# Patient Record
Sex: MALE | Race: WHITE | NOT HISPANIC OR LATINO | Employment: OTHER | URBAN - METROPOLITAN AREA
[De-identification: names, ages, dates, MRNs, and addresses within clinical notes are randomized per-mention and may not be internally consistent; named-entity substitution may affect disease eponyms.]

---

## 2023-08-25 PROBLEM — H53.2 DOUBLE VISION: Status: ACTIVE | Noted: 2023-08-25

## 2023-08-25 PROBLEM — H01.006 BLEPHARITIS OF BOTH EYES: Status: ACTIVE | Noted: 2023-08-25

## 2023-08-25 PROBLEM — H02.204 LAGOPHTHALMOS OF LEFT UPPER EYELID: Status: ACTIVE | Noted: 2023-08-25

## 2023-08-25 PROBLEM — H25.813 COMBINED FORM OF AGE-RELATED CATARACT, BOTH EYES: Status: ACTIVE | Noted: 2023-08-25

## 2023-08-25 PROBLEM — H02.59 SCAR OF EYELID: Status: ACTIVE | Noted: 2023-08-25

## 2023-08-25 PROBLEM — H52.10 MYOPIA WITH PRESBYOPIA: Status: ACTIVE | Noted: 2023-08-25

## 2023-08-25 PROBLEM — H01.003 BLEPHARITIS OF BOTH EYES: Status: ACTIVE | Noted: 2023-08-25

## 2023-08-25 PROBLEM — H52.4 MYOPIA WITH PRESBYOPIA: Status: ACTIVE | Noted: 2023-08-25

## 2023-08-25 PROBLEM — S05.02XA ABRASION OF CORNEA, LEFT: Status: ACTIVE | Noted: 2023-08-25

## 2023-08-25 PROBLEM — S01.112A: Status: ACTIVE | Noted: 2023-08-25

## 2023-08-25 RX ORDER — ANASTROZOLE 1 MG/1
TABLET ORAL
COMMUNITY

## 2023-08-25 RX ORDER — TESTOSTERONE CYPIONATE 200 MG/ML
0.4 INJECTION, SOLUTION INTRAMUSCULAR 2 TIMES WEEKLY
COMMUNITY
Start: 2016-10-06

## 2023-08-25 RX ORDER — NEEDLES, SAFETY 18GX1 1/2"
NEEDLE, DISPOSABLE MISCELLANEOUS
COMMUNITY

## 2023-08-25 RX ORDER — BLOOD SUGAR DIAGNOSTIC
STRIP MISCELLANEOUS
COMMUNITY
Start: 2022-06-06

## 2023-08-25 RX ORDER — ATORVASTATIN CALCIUM 20 MG/1
1 TABLET, FILM COATED ORAL DAILY
COMMUNITY
Start: 2022-07-12

## 2023-08-25 RX ORDER — ESZOPICLONE 2 MG/1
TABLET, FILM COATED ORAL
COMMUNITY
Start: 2020-10-14

## 2023-08-25 RX ORDER — NEEDLES, FILTER 18GX1 1/2"
NEEDLE, DISPOSABLE MISCELLANEOUS
COMMUNITY

## 2023-08-25 RX ORDER — EPINEPHRINE 0.3 MG/.3ML
INJECTION SUBCUTANEOUS
COMMUNITY
Start: 2020-12-03

## 2023-08-25 RX ORDER — INSULIN PUMP SYRINGE, 3 ML
1 EACH MISCELLANEOUS AS NEEDED
COMMUNITY

## 2023-08-25 RX ORDER — CALCIUM CARBONATE/VITAMIN D3 600 MG-10
TABLET ORAL
COMMUNITY

## 2023-08-25 RX ORDER — HYDROCODONE BITARTRATE AND ACETAMINOPHEN 5; 325 MG/1; MG/1
TABLET ORAL
COMMUNITY
Start: 2021-01-18

## 2023-08-25 RX ORDER — METFORMIN HYDROCHLORIDE 500 MG/1
2 TABLET, EXTENDED RELEASE ORAL DAILY
COMMUNITY
Start: 2022-06-06

## 2023-08-25 RX ORDER — OMEPRAZOLE 20 MG/1
CAPSULE, DELAYED RELEASE ORAL
COMMUNITY

## 2023-08-25 RX ORDER — LANCETS 33 GAUGE
EACH MISCELLANEOUS
COMMUNITY

## 2023-08-25 RX ORDER — ERGOCALCIFEROL 1.25 MG/1
CAPSULE ORAL
COMMUNITY
Start: 2020-05-13

## 2023-08-25 RX ORDER — FLUTICASONE PROPIONATE 50 MCG
1 SPRAY, SUSPENSION (ML) NASAL DAILY
COMMUNITY
Start: 2022-05-23

## 2023-08-25 RX ORDER — CYANOCOBALAMIN 1000 UG/ML
INJECTION, SOLUTION INTRAMUSCULAR; SUBCUTANEOUS
COMMUNITY

## 2023-08-25 RX ORDER — FLUOROURACIL 50 MG/G
CREAM TOPICAL
COMMUNITY
Start: 2022-05-26

## 2023-08-25 RX ORDER — OMEGA-3-ACID ETHYL ESTERS 1 G/1
2 CAPSULE, LIQUID FILLED ORAL 2 TIMES DAILY
COMMUNITY
Start: 2020-03-24

## 2023-10-04 ENCOUNTER — OFFICE VISIT (OUTPATIENT)
Dept: OPHTHALMOLOGY | Facility: CLINIC | Age: 63
End: 2023-10-04
Payer: MEDICAID

## 2023-10-04 DIAGNOSIS — H52.4 PRESBYOPIA: ICD-10-CM

## 2023-10-04 DIAGNOSIS — H02.204 LAGOPHTHALMOS OF LEFT UPPER EYELID, UNSPECIFIED LAGOPHTHALMOS TYPE: ICD-10-CM

## 2023-10-04 DIAGNOSIS — H25.812 COMBINED FORM OF AGE-RELATED CATARACT, LEFT EYE: ICD-10-CM

## 2023-10-04 DIAGNOSIS — H17.9 CORNEAL SCAR, LEFT EYE: ICD-10-CM

## 2023-10-04 DIAGNOSIS — H02.59 SCAR OF EYELID: ICD-10-CM

## 2023-10-04 DIAGNOSIS — H25.811 COMBINED FORM OF AGE-RELATED CATARACT, RIGHT EYE: Primary | ICD-10-CM

## 2023-10-04 DIAGNOSIS — H02.889 MEIBOMIAN GLAND DYSFUNCTION: ICD-10-CM

## 2023-10-04 PROCEDURE — 99213 OFFICE O/P EST LOW 20 MIN: CPT | Performed by: OPHTHALMOLOGY

## 2023-10-04 ASSESSMENT — REFRACTION_WEARINGRX
OS_SPHERE: +0.00
OD_ADD: +2.50
OD_SPHERE: -0.25
OS_ADD: +2.50

## 2023-10-04 ASSESSMENT — VISUAL ACUITY
OS_CC: 20/20-1
OD_CC: 20/20
CORRECTION_TYPE: GLASSES
METHOD: SNELLEN - LINEAR

## 2023-10-04 ASSESSMENT — ENCOUNTER SYMPTOMS
ALLERGIC/IMMUNOLOGIC NEGATIVE: 0
CONSTITUTIONAL NEGATIVE: 0
NEUROLOGICAL NEGATIVE: 0
HEMATOLOGIC/LYMPHATIC NEGATIVE: 0
EYES NEGATIVE: 0
ENDOCRINE NEGATIVE: 0
CARDIOVASCULAR NEGATIVE: 0
PSYCHIATRIC NEGATIVE: 0
RESPIRATORY NEGATIVE: 0
GASTROINTESTINAL NEGATIVE: 0
MUSCULOSKELETAL NEGATIVE: 0

## 2023-10-04 ASSESSMENT — CUP TO DISC RATIO
OS_RATIO: 0.1
OD_RATIO: 0.2

## 2023-10-04 ASSESSMENT — TONOMETRY
OD_IOP_MMHG: 12
IOP_METHOD: GOLDMANN APPLANATION
OS_IOP_MMHG: 12

## 2023-10-04 ASSESSMENT — SLIT LAMP EXAM - LIDS
COMMENTS: GOOD POSITION
COMMENTS: GOOD POSITION

## 2023-10-04 ASSESSMENT — REFRACTION_MANIFEST
OD_SPHERE: -0.25
OD_ADD: +2.50
OS_ADD: +2.50
OS_SPHERE: +0.00

## 2023-10-04 ASSESSMENT — EXTERNAL EXAM - LEFT EYE: OS_EXAM: NORMAL

## 2023-10-04 ASSESSMENT — EXTERNAL EXAM - RIGHT EYE: OD_EXAM: NORMAL

## 2023-10-04 NOTE — PROGRESS NOTES
Combined form of age-related cataract, right eyeH25.811  Combined form of age-related cataract, left eyeH25.812  -Not visually significant at this time. Monitor.     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 Coburn 5/5/20 - excellent result, patient happy, no significant lagophthalmos  -Advised to call as needed if FBS/pain/redness due to possible trichiasis. No lashes epilated today.   -Per patient, states that after reading for a period of time, when he looks away things at a distance are blurry, but this can last for 30 minutes or more.  If this happen for short period of time, discussed with patient accommodative spasm.  If for a longer period of time, would suspect dry eye and recommended using artificial tears.  -F/u 1 year.     Corneal scar, left eyeH17.9  -Not visually significant. Unknown etiology. Monitor.     VdkkhpwbgyT73.4  -Continue current OTC reading glasses +2.50. Can consider bifocals/progressives as needed.       PRIOR OCULAR HISTORY:  Corneal abrasion OS - August 2022 - saw Dr. Coburn - resolved with gel tears

## 2024-01-16 PROCEDURE — 88305 TISSUE EXAM BY PATHOLOGIST: CPT

## 2024-01-16 PROCEDURE — 88305 TISSUE EXAM BY PATHOLOGIST: CPT | Performed by: PATHOLOGY

## 2024-01-17 ENCOUNTER — LAB REQUISITION (OUTPATIENT)
Dept: LAB | Facility: HOSPITAL | Age: 64
End: 2024-01-17
Payer: MEDICAID

## 2024-01-17 DIAGNOSIS — K91.850 POUCHITIS (MULTI): ICD-10-CM

## 2024-01-23 LAB
LABORATORY COMMENT REPORT: NORMAL
PATH REPORT.FINAL DX SPEC: NORMAL
PATH REPORT.GROSS SPEC: NORMAL
PATH REPORT.RELEVANT HX SPEC: NORMAL
PATH REPORT.TOTAL CANCER: NORMAL

## 2024-10-05 ASSESSMENT — EXTERNAL EXAM - LEFT EYE: OS_EXAM: NORMAL

## 2024-10-05 ASSESSMENT — SLIT LAMP EXAM - LIDS: COMMENTS: GOOD POSITION

## 2024-10-05 ASSESSMENT — CUP TO DISC RATIO
OS_RATIO: 0.1
OD_RATIO: 0.2

## 2024-10-05 ASSESSMENT — EXTERNAL EXAM - RIGHT EYE: OD_EXAM: NORMAL

## 2024-10-05 NOTE — PROGRESS NOTES
Combined form of age-related cataract, right eyeH25.811  Combined form of age-related cataract, left eyeH25.812  -Not visually significant at this time. Monitor.     Lagophthalmos of left upper onjyyqN68.204  Scar of piavgoM11.59  Trichiasis of left djyjhzN26.056  Meibomian gland dysfunction (MGD) of upper and lower lids of both eyesH02.89  -S/p [facility] surgery with Dr. Coburn 5/5/20 - excellent result, patient happy, no significant lagophthalmos  -Advised to call as needed if FBS/pain/redness due to possible trichiasis. No lashes epilated today.   -Per patient, states that after reading for a period of time, when he looks away things at a distance are blurry, but this can last for 30 minutes or more.  If this happen for short period of time, discussed with patient accommodative spasm.  If for a longer period of time, would suspect dry eye and recommended using artificial tears.  -F/u 1 year.     Corneal scar, left eyeH17.9  -Not visually significant. Unknown etiology. Monitor.     HutjxkwejgH73.4  -Patient states does not like bifocals/progressives  -Continue current OTC reading glasses +2.50. Declines bifocals/progressives.   -New Rx for glasses given per patient request. Patient's signature obtained to acknowledge and confirm that a paper copy of glasses Rx was given to patient in compliance with [facility] Eyeglass Rule. Electronic copy of Rx will also be available via [employer].       PRIOR OCULAR HISTORY:  Corneal abrasion OS - August 2022 - saw Dr. Coburn - resolved with gel tears

## 2024-10-09 ENCOUNTER — APPOINTMENT (OUTPATIENT)
Dept: OPHTHALMOLOGY | Facility: CLINIC | Age: 64
End: 2024-10-09
Payer: MEDICAID

## 2024-10-09 DIAGNOSIS — H02.889 MEIBOMIAN GLAND DYSFUNCTION: ICD-10-CM

## 2024-10-09 DIAGNOSIS — H25.811 COMBINED FORM OF AGE-RELATED CATARACT, RIGHT EYE: Primary | ICD-10-CM

## 2024-10-09 DIAGNOSIS — H02.204 LAGOPHTHALMOS OF LEFT UPPER EYELID, UNSPECIFIED LAGOPHTHALMOS TYPE: ICD-10-CM

## 2024-10-09 DIAGNOSIS — H52.4 PRESBYOPIA: ICD-10-CM

## 2024-10-09 DIAGNOSIS — H17.9 CORNEAL SCAR, LEFT EYE: ICD-10-CM

## 2024-10-09 DIAGNOSIS — H02.59 SCAR OF EYELID: ICD-10-CM

## 2024-10-09 DIAGNOSIS — H25.812 COMBINED FORM OF AGE-RELATED CATARACT, LEFT EYE: ICD-10-CM

## 2024-10-09 PROCEDURE — 92015 DETERMINE REFRACTIVE STATE: CPT | Performed by: OPHTHALMOLOGY

## 2024-10-09 PROCEDURE — 99213 OFFICE O/P EST LOW 20 MIN: CPT | Performed by: OPHTHALMOLOGY

## 2024-10-09 ASSESSMENT — ENCOUNTER SYMPTOMS
MUSCULOSKELETAL NEGATIVE: 0
NEUROLOGICAL NEGATIVE: 0
EYES NEGATIVE: 1
CARDIOVASCULAR NEGATIVE: 0
ALLERGIC/IMMUNOLOGIC NEGATIVE: 0
ENDOCRINE NEGATIVE: 0
RESPIRATORY NEGATIVE: 0
CONSTITUTIONAL NEGATIVE: 0
HEMATOLOGIC/LYMPHATIC NEGATIVE: 0
GASTROINTESTINAL NEGATIVE: 0
PSYCHIATRIC NEGATIVE: 0

## 2024-10-09 ASSESSMENT — REFRACTION_WEARINGRX
OD_SPHERE: +2.50
OS_SPHERE: +2.50
SPECS_TYPE: OTC NVO
OS_CYLINDER: SPHERE
OD_CYLINDER: SPHERE

## 2024-10-09 ASSESSMENT — REFRACTION_MANIFEST
OD_CYLINDER: SPHERE
OS_ADD: +2.50
OD_ADD: +2.50
OD_SPHERE: PLANO
OS_CYLINDER: SPHERE
OS_SPHERE: +0.50

## 2024-10-09 ASSESSMENT — TONOMETRY
OD_IOP_MMHG: 12
OS_IOP_MMHG: 11
IOP_METHOD: GOLDMANN APPLANATION

## 2024-10-09 ASSESSMENT — VISUAL ACUITY
OS_BAT_MED: 20/20
METHOD: SNELLEN - LINEAR
OS_SC: 20/25
CORRECTION_TYPE: GLASSES
OD_SC: 20/20
OD_BAT_MED: 20/20

## 2025-10-15 ENCOUNTER — APPOINTMENT (OUTPATIENT)
Dept: OPHTHALMOLOGY | Age: 65
End: 2025-10-15
Payer: MEDICAID